# Patient Record
Sex: MALE | Race: WHITE | HISPANIC OR LATINO | Employment: FULL TIME | URBAN - METROPOLITAN AREA
[De-identification: names, ages, dates, MRNs, and addresses within clinical notes are randomized per-mention and may not be internally consistent; named-entity substitution may affect disease eponyms.]

---

## 2020-09-24 ENCOUNTER — HOSPITAL ENCOUNTER (EMERGENCY)
Facility: HOSPITAL | Age: 25
Discharge: HOME | End: 2020-09-25
Attending: EMERGENCY MEDICINE
Payer: OTHER GOVERNMENT

## 2020-09-24 DIAGNOSIS — E86.0 MILD DEHYDRATION: ICD-10-CM

## 2020-09-24 DIAGNOSIS — R11.0 NAUSEA: ICD-10-CM

## 2020-09-24 DIAGNOSIS — R74.01 TRANSAMINITIS: ICD-10-CM

## 2020-09-24 DIAGNOSIS — B34.9 ACUTE VIRAL SYNDROME: Primary | ICD-10-CM

## 2020-09-24 DIAGNOSIS — R07.9 NONSPECIFIC CHEST PAIN: ICD-10-CM

## 2020-09-24 LAB
ALBUMIN SERPL BCP-MCNC: 4.7 G/DL (ref 3.5–5.2)
ALP SERPL-CCNC: 88 U/L (ref 55–135)
ALT SERPL W/O P-5'-P-CCNC: 86 U/L (ref 10–44)
ANION GAP SERPL CALC-SCNC: 11 MMOL/L (ref 8–16)
AST SERPL-CCNC: 57 U/L (ref 10–40)
BASOPHILS # BLD AUTO: 0.02 K/UL (ref 0–0.2)
BASOPHILS NFR BLD: 0.3 % (ref 0–1.9)
BILIRUB SERPL-MCNC: 1.6 MG/DL (ref 0.1–1)
BUN SERPL-MCNC: 7 MG/DL (ref 6–20)
CALCIUM SERPL-MCNC: 9.1 MG/DL (ref 8.7–10.5)
CHLORIDE SERPL-SCNC: 102 MMOL/L (ref 95–110)
CK SERPL-CCNC: 112 U/L (ref 20–200)
CO2 SERPL-SCNC: 24 MMOL/L (ref 23–29)
CREAT SERPL-MCNC: 0.9 MG/DL (ref 0.5–1.4)
CRP SERPL-MCNC: 2.49 MG/DL (ref 0–0.75)
DIFFERENTIAL METHOD: ABNORMAL
EOSINOPHIL # BLD AUTO: 0.1 K/UL (ref 0–0.5)
EOSINOPHIL NFR BLD: 1.1 % (ref 0–8)
ERYTHROCYTE [DISTWIDTH] IN BLOOD BY AUTOMATED COUNT: 11.9 % (ref 11.5–14.5)
EST. GFR  (AFRICAN AMERICAN): >60 ML/MIN/1.73 M^2
EST. GFR  (NON AFRICAN AMERICAN): >60 ML/MIN/1.73 M^2
GLUCOSE SERPL-MCNC: 105 MG/DL (ref 70–110)
HCT VFR BLD AUTO: 45.5 % (ref 40–54)
HGB BLD-MCNC: 15.9 G/DL (ref 14–18)
IMM GRANULOCYTES # BLD AUTO: 0.02 K/UL (ref 0–0.04)
IMM GRANULOCYTES NFR BLD AUTO: 0.3 % (ref 0–0.5)
LIPASE SERPL-CCNC: 16 U/L (ref 4–60)
LYMPHOCYTES # BLD AUTO: 0.9 K/UL (ref 1–4.8)
LYMPHOCYTES NFR BLD: 12.2 % (ref 18–48)
MCH RBC QN AUTO: 30.8 PG (ref 27–31)
MCHC RBC AUTO-ENTMCNC: 34.9 G/DL (ref 32–36)
MCV RBC AUTO: 88 FL (ref 82–98)
MONOCYTES # BLD AUTO: 0.4 K/UL (ref 0.3–1)
MONOCYTES NFR BLD: 5.9 % (ref 4–15)
NEUTROPHILS # BLD AUTO: 5.8 K/UL (ref 1.8–7.7)
NEUTROPHILS NFR BLD: 80.2 % (ref 38–73)
NRBC BLD-RTO: 0 /100 WBC
PLATELET # BLD AUTO: 251 K/UL (ref 150–350)
PMV BLD AUTO: 10.1 FL (ref 9.2–12.9)
POTASSIUM SERPL-SCNC: 3.9 MMOL/L (ref 3.5–5.1)
PROT SERPL-MCNC: 8.4 G/DL (ref 6–8.4)
RBC # BLD AUTO: 5.16 M/UL (ref 4.6–6.2)
SARS-COV-2 RDRP RESP QL NAA+PROBE: NEGATIVE
SODIUM SERPL-SCNC: 137 MMOL/L (ref 136–145)
TROPONIN I SERPL DL<=0.01 NG/ML-MCNC: <0.01 NG/ML (ref 0.02–0.5)
WBC # BLD AUTO: 7.27 K/UL (ref 3.9–12.7)

## 2020-09-24 PROCEDURE — 71045 X-RAY EXAM CHEST 1 VIEW: CPT | Mod: TC,FY

## 2020-09-24 PROCEDURE — 93005 ELECTROCARDIOGRAM TRACING: CPT

## 2020-09-24 PROCEDURE — U0002 COVID-19 LAB TEST NON-CDC: HCPCS

## 2020-09-24 PROCEDURE — 99285 EMERGENCY DEPT VISIT HI MDM: CPT | Mod: 25

## 2020-09-24 PROCEDURE — 83690 ASSAY OF LIPASE: CPT

## 2020-09-24 PROCEDURE — 86140 C-REACTIVE PROTEIN: CPT

## 2020-09-24 PROCEDURE — 71045 X-RAY EXAM CHEST 1 VIEW: CPT | Mod: 26,,, | Performed by: RADIOLOGY

## 2020-09-24 PROCEDURE — 74176 CT ABD & PELVIS W/O CONTRAST: CPT | Mod: 26,,, | Performed by: RADIOLOGY

## 2020-09-24 PROCEDURE — 74176 CT ABDOMEN PELVIS WITHOUT CONTRAST: ICD-10-PCS | Mod: 26,,, | Performed by: RADIOLOGY

## 2020-09-24 PROCEDURE — 96360 HYDRATION IV INFUSION INIT: CPT

## 2020-09-24 PROCEDURE — 25000003 PHARM REV CODE 250: Performed by: EMERGENCY MEDICINE

## 2020-09-24 PROCEDURE — 84484 ASSAY OF TROPONIN QUANT: CPT

## 2020-09-24 PROCEDURE — 80053 COMPREHEN METABOLIC PANEL: CPT

## 2020-09-24 PROCEDURE — 81003 URINALYSIS AUTO W/O SCOPE: CPT

## 2020-09-24 PROCEDURE — 71045 XR CHEST 1 VIEW: ICD-10-PCS | Mod: 26,,, | Performed by: RADIOLOGY

## 2020-09-24 PROCEDURE — 96361 HYDRATE IV INFUSION ADD-ON: CPT

## 2020-09-24 PROCEDURE — 82550 ASSAY OF CK (CPK): CPT

## 2020-09-24 PROCEDURE — 74176 CT ABD & PELVIS W/O CONTRAST: CPT | Mod: TC

## 2020-09-24 PROCEDURE — 85025 COMPLETE CBC W/AUTO DIFF WBC: CPT

## 2020-09-24 RX ORDER — ACETAMINOPHEN 500 MG
1000 TABLET ORAL
Status: COMPLETED | OUTPATIENT
Start: 2020-09-24 | End: 2020-09-24

## 2020-09-24 RX ADMIN — SODIUM CHLORIDE 1000 ML: 0.9 INJECTION, SOLUTION INTRAVENOUS at 10:09

## 2020-09-24 RX ADMIN — ACETAMINOPHEN 1000 MG: 500 TABLET, FILM COATED ORAL at 11:09

## 2020-09-25 VITALS
RESPIRATION RATE: 16 BRPM | HEART RATE: 94 BPM | HEIGHT: 67 IN | DIASTOLIC BLOOD PRESSURE: 69 MMHG | BODY MASS INDEX: 29.03 KG/M2 | TEMPERATURE: 100 F | OXYGEN SATURATION: 96 % | SYSTOLIC BLOOD PRESSURE: 109 MMHG | WEIGHT: 185 LBS

## 2020-09-25 LAB
BILIRUB UR QL STRIP: ABNORMAL
CLARITY UR: CLEAR
COLOR UR: YELLOW
GLUCOSE UR QL STRIP: NEGATIVE
HGB UR QL STRIP: ABNORMAL
KETONES UR QL STRIP: NEGATIVE
LEUKOCYTE ESTERASE UR QL STRIP: NEGATIVE
NITRITE UR QL STRIP: NEGATIVE
PH UR STRIP: 6 [PH] (ref 5–8)
PROT UR QL STRIP: NEGATIVE
SP GR UR STRIP: 1.02 (ref 1–1.03)
URN SPEC COLLECT METH UR: ABNORMAL
UROBILINOGEN UR STRIP-ACNC: NEGATIVE EU/DL

## 2020-09-25 RX ORDER — HYOSCYAMINE SULFATE 0.125 MG
125 TABLET ORAL EVERY 6 HOURS PRN
Qty: 5 TABLET | Refills: 0 | Status: SHIPPED | OUTPATIENT
Start: 2020-09-25 | End: 2023-01-24

## 2020-09-25 RX ORDER — KETOROLAC TROMETHAMINE 10 MG/1
10 TABLET, FILM COATED ORAL EVERY 6 HOURS PRN
Qty: 15 TABLET | Refills: 0 | Status: SHIPPED | OUTPATIENT
Start: 2020-09-25 | End: 2020-09-30

## 2020-09-25 RX ORDER — ONDANSETRON 4 MG/1
4 TABLET, FILM COATED ORAL EVERY 8 HOURS PRN
Qty: 12 TABLET | Refills: 0 | Status: SHIPPED | OUTPATIENT
Start: 2020-09-25 | End: 2023-01-24

## 2020-09-25 NOTE — ED TRIAGE NOTES
chest pain and abdominal pain during inhalation/exhalation started this AM reports fever and taking ibprofen PTA

## 2020-09-25 NOTE — ED PROVIDER NOTES
Encounter Date: 9/24/2020       History     Chief Complaint   Patient presents with    Chest Pain    Abdominal Pain     25-year-old male no significant reported past medical/surgical history presenting with complaints gradual onset of diffuse upper abdominal pain as well as chest pain associated with shortness of breath and fever over the last 2 days.  Denies sore throat, nausea vomiting.  Patient does report decreased oral intake with headache.  Denies dysuria or frequency.  Reports radiation of pain to his back.  Denies fall/direct trauma or other injury.  Denies rash or lesions.  Denies known sick contacts.  Denies specific exacerbating or alleviating factors.  Denies prior abdominal surgery.        Review of patient's allergies indicates:   Allergen Reactions    Neosporin [benzalkonium chloride]      History reviewed. No pertinent past medical history.  History reviewed. No pertinent surgical history.  History reviewed. No pertinent family history.  Social History     Tobacco Use    Smoking status: Never Smoker    Smokeless tobacco: Never Used   Substance Use Topics    Alcohol use: Not Currently    Drug use: Never     Review of Systems   Constitutional: Positive for activity change. Negative for fever.   HENT: Negative for sore throat.    Eyes: Negative for visual disturbance.   Respiratory: Positive for shortness of breath.    Cardiovascular: Positive for chest pain.   Gastrointestinal: Positive for abdominal pain. Negative for diarrhea, nausea and vomiting.   Genitourinary: Positive for flank pain. Negative for difficulty urinating, discharge, dysuria, penile pain, scrotal swelling and testicular pain.   Musculoskeletal: Negative for arthralgias, back pain and myalgias.   Skin: Negative for rash.   Neurological: Positive for headaches. Negative for weakness.   Hematological: Does not bruise/bleed easily.       Physical Exam     Initial Vitals [09/24/20 2232]   BP Pulse Resp Temp SpO2   (!) 143/82 (!)  128 20 (!) 102.9 °F (39.4 °C) 98 %      MAP       --         Physical Exam    Nursing note and vitals reviewed.  Constitutional: He appears well-developed and well-nourished.   HENT:   Head: Normocephalic and atraumatic.   Mouth/Throat: Uvula is midline, oropharynx is clear and moist and mucous membranes are normal. No trismus in the jaw.   Eyes: Conjunctivae and EOM are normal. Pupils are equal, round, and reactive to light.   Neck: Normal range of motion. Neck supple.   Cardiovascular: Regular rhythm, normal heart sounds and intact distal pulses. Tachycardia present.    Pulmonary/Chest: Breath sounds normal. No respiratory distress. He has no wheezes.   Abdominal: Soft. Bowel sounds are normal. He exhibits no distension. There is generalized abdominal tenderness.   Musculoskeletal: Normal range of motion. No tenderness or edema.   Neurological: He is alert and oriented to person, place, and time. GCS eye subscore is 4. GCS verbal subscore is 5. GCS motor subscore is 6.   Skin: Skin is warm. Capillary refill takes less than 2 seconds.         ED Course   Procedures  Labs Reviewed   CBC W/ AUTO DIFFERENTIAL - Abnormal; Notable for the following components:       Result Value    Lymph # 0.9 (*)     Gran% 80.2 (*)     Lymph% 12.2 (*)     All other components within normal limits   COMPREHENSIVE METABOLIC PANEL - Abnormal; Notable for the following components:    Total Bilirubin 1.6 (*)     AST 57 (*)     ALT 86 (*)     All other components within normal limits   TROPONIN I - Abnormal; Notable for the following components:    Troponin I <0.01 (*)     All other components within normal limits   C-REACTIVE PROTEIN - Abnormal; Notable for the following components:    CRP 2.49 (*)     All other components within normal limits   URINALYSIS, REFLEX TO URINE CULTURE - Abnormal; Notable for the following components:    Bilirubin (UA) 1+ (*)     Occult Blood UA Trace (*)     All other components within normal limits     Narrative:     Specimen Source->Urine   CK   LIPASE   SARS-COV-2 RNA AMPLIFICATION, QUAL     EKG Readings: (Independently Interpreted)   Initial Reading: No STEMI. Previous EKG Date: No previous available for comparison. Rhythm: Sinus Tachycardia. Heart Rate: 120bpm. Ectopy: No Ectopy. Conduction: Normal. T Waves Flipped: III. Axis: Normal. Other Impression: Sinus tachycardia without ectopy.  No STEMI.  Nonspecific T-wave inversion in lead 3 with nonspecific ST changes.   milliseconds.  No previous available for comparison.       Imaging Results          CT Abdomen Pelvis  Without Contrast (In process)                X-Ray Chest 1 View (In process)               X-Rays:   Independently Interpreted Readings:   Other Readings:  Preliminary chest x-ray without acute cardiopulmonary processes.  No pneumonia pneumothorax.  No cardiomegaly or pleural effusion.    Medical Decision Making:   Initial Assessment:   25-year-old male nontoxic-appearing, febrile, diffuse myalgias, chest pain, abdominal pain tachycardic and febrile on arrival.  No meningismus.  No rash or lesions.  Abdomen soft no peritoneal findings no palpable mass mild diffuse upper abdominal tenderness without focality.  No McBurney's point tenderness.  Will send screening labs, COVID-19 testing, symptomatic treatment, IV hydration and reassessment  Differential Diagnosis:   Preliminary laboratory data without leukocytosis.  No significant anemia.  Metabolic panel without acidemia.  Mild transaminitis with elevated alkaline phosphatase of uncertain etiology.  Due to significant fever with no known source and diffuse abdominal pain will CT rule out acute cholecystitis, obstructed/infected uropathy.  Patient with significant allergy to all seafood will performed without contrast due to possible iodine allergy  Clinical Tests:   Lab Tests: Ordered and Reviewed  Radiological Study: Ordered and Reviewed  Medical Tests: Ordered and Reviewed  ED  Management:  Preliminary CT abdomen pelvis without acute intra-abdominal pathology per virtual Radiology.  No evidence of appendicitis.  No liver/biliary abnormality.  No bowel obstruction or perforation.  Upon reassessment patient resting no acute distress.  Tolerating p.o. without difficulty.  Normalization of vital signs after defervescence.  No increased work of breathing or any episodes of hypoxia/oxygen desaturation on continuous pulse oximetry.  Normalization of heart rate with continued sinus rhythm without any episodes of ectopy on continuous cardiac telemetry. Patient denies any complaints.  Reviewed and discussed laboratory and radiographic data.  Discussed symptomatic treatment for appears to be viral syndrome with close follow-up by his primary care physician or physician of his choice from the provider list.  Discussed symptomatic treatment, the importance of adequate hydration and strict return to ED precautions.  Patient verbalized understanding, his amenability to this projected plan of care and all his questions answered to his apparent satisfaction    Additional MDM:   Heart Score:    History:          Slightly suspicious.  ECG:             Nonspecific repolarisation disturbance  Age:               Less than 45 years  Risk factors: no risk factors known  Troponin:       Less than or equal to normal limit  Final Score: 1                              Clinical Impression:       ICD-10-CM ICD-9-CM   1. Acute viral syndrome  B34.9 079.99   2. Nonspecific chest pain  R07.9 786.50   3. Transaminitis  R74.0 790.4   4. Mild dehydration  E86.0 276.51   5. Nausea  R11.0 787.02                          ED Disposition Condition    Discharge Stable        ED Prescriptions     Medication Sig Dispense Start Date End Date Auth. Provider    ondansetron (ZOFRAN) 4 MG tablet Take 1 tablet (4 mg total) by mouth every 8 (eight) hours as needed for Nausea. 12 tablet 9/25/2020  Tay Mayorga,     hyoscyamine  (ANASPAZ,LEVSIN) 0.125 mg Tab Take 1 tablet (125 mcg total) by mouth every 6 (six) hours as needed. 5 tablet 9/25/2020 10/7/2020 Tay Mayorga,     ketorolac (TORADOL) 10 mg tablet Take 1 tablet (10 mg total) by mouth every 6 (six) hours as needed for Pain. 15 tablet 9/25/2020 9/30/2020 Tay Mayorga DO        Follow-up Information     Follow up With Specialties Details Why Contact Info    PRimary care physician or physician of your choice from the provider list  Schedule an appointment as soon as possible for a visit in 2 days for reevaluation     [facility] - ED Emergency Medicine  As needed, If symptoms worsen [address] [facility] [address] [phone]                                       Tay Mayorga DO  09/25/20 0250

## 2023-06-04 ENCOUNTER — HOSPITAL ENCOUNTER (EMERGENCY)
Facility: HOSPITAL | Age: 28
Discharge: HOME | End: 2023-06-04
Attending: EMERGENCY MEDICINE
Payer: COMMERCIAL

## 2023-06-04 VITALS
HEART RATE: 84 BPM | DIASTOLIC BLOOD PRESSURE: 95 MMHG | SYSTOLIC BLOOD PRESSURE: 120 MMHG | RESPIRATION RATE: 17 BRPM | WEIGHT: 180 LBS | BODY MASS INDEX: 28.25 KG/M2 | OXYGEN SATURATION: 97 % | HEIGHT: 67 IN | TEMPERATURE: 98 F

## 2023-06-04 DIAGNOSIS — K52.9 GASTROENTERITIS: Primary | ICD-10-CM

## 2023-06-04 PROCEDURE — 96361 HYDRATE IV INFUSION ADD-ON: CPT

## 2023-06-04 PROCEDURE — 99284 EMERGENCY DEPT VISIT MOD MDM: CPT | Mod: 25

## 2023-06-04 PROCEDURE — 96374 THER/PROPH/DIAG INJ IV PUSH: CPT

## 2023-06-04 PROCEDURE — 63600175 PHARM REV CODE 636 W HCPCS: Performed by: EMERGENCY MEDICINE

## 2023-06-04 RX ORDER — ONDANSETRON 4 MG/1
4 TABLET, FILM COATED ORAL EVERY 6 HOURS
Qty: 12 TABLET | Refills: 0 | Status: SHIPPED | OUTPATIENT
Start: 2023-06-04 | End: 2023-06-22

## 2023-06-04 RX ORDER — ONDANSETRON 2 MG/ML
4 INJECTION INTRAMUSCULAR; INTRAVENOUS
Status: COMPLETED | OUTPATIENT
Start: 2023-06-04 | End: 2023-06-04

## 2023-06-04 RX ADMIN — ONDANSETRON 4 MG: 2 INJECTION INTRAMUSCULAR; INTRAVENOUS at 02:06

## 2023-06-04 NOTE — ED PROVIDER NOTES
Encounter Date: 6/4/2023       History     Chief Complaint   Patient presents with    nausea;vomiting;diarrhea      Since midnight      28-year-old male with watery vomiting and diarrhea since 2:00 a.m. today.  Patient has not vomited in a couple of hours but had watery loose stools about 30 minutes ago.  No recent sick contacts.    The history is provided by the patient, medical records and a relative.   Review of patient's allergies indicates:   Allergen Reactions    Neosporin [benzalkonium chloride]      No past medical history on file.  No past surgical history on file.  Family History   Problem Relation Age of Onset    Diabetes Mother     Diabetes Father     Lupus Maternal Grandmother      Social History     Tobacco Use    Smoking status: Never    Smokeless tobacco: Never   Substance Use Topics    Alcohol use: Yes     Comment: occ    Drug use: Never     Review of Systems   Constitutional:  Positive for fatigue.   Gastrointestinal:  Positive for diarrhea and vomiting.   All other systems reviewed and are negative.    Physical Exam     Initial Vitals [06/04/23 1350]   BP Pulse Resp Temp SpO2   (!) 120/95 105 17 97.9 °F (36.6 °C) 97 %      MAP       --         Physical Exam    Nursing note and vitals reviewed.  Constitutional: He appears well-developed and well-nourished.   HENT:   Head: Atraumatic.   Dry mucous membranes.   Eyes: EOM are normal.   Neck: Neck supple.   Cardiovascular:            Tachycardic.   Pulmonary/Chest: Breath sounds normal. No respiratory distress.   Abdominal: Abdomen is soft.   Musculoskeletal:         General: No edema.      Cervical back: Neck supple.     Neurological: He is alert and oriented to person, place, and time.   Skin: Skin is warm and dry.   Psychiatric: He has a normal mood and affect.       ED Course   Procedures  Labs Reviewed - No data to display       Imaging Results    None          Medications   sodium chloride 0.9% bolus 2,000 mL 2,000 mL (0 mLs Intravenous Stopped  6/4/23 1508)   ondansetron injection 4 mg (4 mg Intravenous Given 6/4/23 1403)     Medical Decision Making:   Differential Diagnosis:   Gastroenteritis, dehydration  ED Management:  Healthy 28-year-old male with 12 hours of vomiting and diarrhea.  Patient appears dehydrated.  We will give something for nausea and fluid boluses.  Expand workup as needed.           ED Course as of 06/04/23 1508   Sun Jun 04, 2023   1507 Stable at discharge.  Patient's pulse was averaging about 105, now mid 70s. [RJ]      ED Course User Index  [RJ] Vance Matthews MD                 Clinical Impression:   Final diagnoses:  [K52.9] Gastroenteritis (Primary)        ED Disposition Condition    Discharge Stable          ED Prescriptions       Medication Sig Dispense Start Date End Date Auth. Provider    ondansetron (ZOFRAN) 4 MG tablet Take 1 tablet (4 mg total) by mouth every 6 (six) hours. 12 tablet 6/4/2023 -- Vance Matthews MD          Follow-up Information       Follow up With Specialties Details Why Contact Info    [place] - Emergency Dept Emergency Medicine  As needed [address] [facility] [address] [phone]             Vance Matthews MD  06/04/23 1504

## 2023-06-16 ENCOUNTER — HOSPITAL ENCOUNTER (OUTPATIENT)
Dept: RADIOLOGY | Facility: HOSPITAL | Age: 28
Discharge: HOME | End: 2023-06-16
Attending: NURSE PRACTITIONER
Payer: COMMERCIAL

## 2023-06-16 ENCOUNTER — HOSPITAL ENCOUNTER (OUTPATIENT)
Dept: CARDIOLOGY | Facility: HOSPITAL | Age: 28
Discharge: HOME | End: 2023-06-16
Attending: NURSE PRACTITIONER
Payer: COMMERCIAL

## 2023-06-16 VITALS — BODY MASS INDEX: 29.66 KG/M2 | HEIGHT: 67 IN | WEIGHT: 189 LBS

## 2023-06-16 DIAGNOSIS — R79.89 ELEVATED D-DIMER: ICD-10-CM

## 2023-06-16 DIAGNOSIS — M79.601 BILATERAL ARM PAIN: ICD-10-CM

## 2023-06-16 DIAGNOSIS — R79.89 LOW TSH LEVEL: ICD-10-CM

## 2023-06-16 DIAGNOSIS — R61 DIAPHORESIS: ICD-10-CM

## 2023-06-16 DIAGNOSIS — R74.01 ELEVATED AST (SGOT): ICD-10-CM

## 2023-06-16 DIAGNOSIS — R07.9 CHEST PAIN, UNSPECIFIED TYPE: ICD-10-CM

## 2023-06-16 DIAGNOSIS — Z82.69 FAMILY HISTORY OF SYSTEMIC LUPUS ERYTHEMATOSUS: ICD-10-CM

## 2023-06-16 DIAGNOSIS — K76.0 HEPATIC STEATOSIS: ICD-10-CM

## 2023-06-16 DIAGNOSIS — M79.602 BILATERAL ARM PAIN: ICD-10-CM

## 2023-06-16 DIAGNOSIS — R52 GENERALIZED BODY ACHES: ICD-10-CM

## 2023-06-16 DIAGNOSIS — R07.89 CHEST TIGHTNESS: ICD-10-CM

## 2023-06-16 DIAGNOSIS — R03.0 ELEVATED BLOOD PRESSURE READING WITHOUT DIAGNOSIS OF HYPERTENSION: ICD-10-CM

## 2023-06-16 DIAGNOSIS — Z11.52 ENCOUNTER FOR SCREENING FOR COVID-19: ICD-10-CM

## 2023-06-16 DIAGNOSIS — R68.83 CHILLS (WITHOUT FEVER): ICD-10-CM

## 2023-06-16 DIAGNOSIS — R10.9 ABDOMINAL PAIN, UNSPECIFIED ABDOMINAL LOCATION: ICD-10-CM

## 2023-06-16 LAB
AORTIC ROOT ANNULUS: 2.82 CM
AORTIC VALVE CUSP SEPERATION: 1.53 CM
ASCENDING AORTA: 2.66 CM
AV INDEX (PROSTH): 0.69
AV MEAN GRADIENT: 5 MMHG
AV PEAK GRADIENT: 9 MMHG
AV VALVE AREA: 2.39 CM2
AV VELOCITY RATIO: 0.73
BSA FOR ECHO PROCEDURE: 2.01 M2
CV ECHO LV RWT: 0.45 CM
DOP CALC AO PEAK VEL: 1.47 M/S
DOP CALC AO VTI: 28 CM
DOP CALC LVOT AREA: 3.5 CM2
DOP CALC LVOT DIAMETER: 2.1 CM
DOP CALC LVOT PEAK VEL: 1.08 M/S
DOP CALC LVOT STROKE VOLUME: 66.81 CM3
DOP CALC MV VTI: 30.1 CM
DOP CALCLVOT PEAK VEL VTI: 19.3 CM
E WAVE DECELERATION TIME: 234.52 MSEC
E/A RATIO: 1.56
E/E' RATIO: 5.76 M/S
ECHO LV POSTERIOR WALL: 1 CM (ref 0.6–1.1)
EJECTION FRACTION: 67 %
FRACTIONAL SHORTENING: 37 % (ref 28–44)
INTERVENTRICULAR SEPTUM: 1.02 CM (ref 0.6–1.1)
IVC DIAMETER: 1.75 CM
IVRT: 34.25 MSEC
LA MAJOR: 3.62 CM
LA MINOR: 2.2 CM
LEFT ATRIUM SIZE: 3.71 CM
LEFT ATRIUM VOLUME INDEX MOD: 12.4 ML/M2
LEFT ATRIUM VOLUME MOD: 24.49 CM3
LEFT INTERNAL DIMENSION IN SYSTOLE: 2.82 CM (ref 2.1–4)
LEFT VENTRICLE DIASTOLIC VOLUME INDEX: 46.4 ML/M2
LEFT VENTRICLE DIASTOLIC VOLUME: 91.4 ML
LEFT VENTRICLE MASS INDEX: 78 G/M2
LEFT VENTRICLE SYSTOLIC VOLUME INDEX: 15.3 ML/M2
LEFT VENTRICLE SYSTOLIC VOLUME: 30.05 ML
LEFT VENTRICULAR INTERNAL DIMENSION IN DIASTOLE: 4.48 CM (ref 3.5–6)
LEFT VENTRICULAR MASS: 154.28 G
LV LATERAL E/E' RATIO: 4.75 M/S
LV SEPTAL E/E' RATIO: 7.31 M/S
LVOT MG: 2.57 MMHG
LVOT MV: 0.75 CM/S
MV MEAN GRADIENT: 2 MMHG
MV PEAK A VEL: 0.61 M/S
MV PEAK E VEL: 0.95 M/S
MV PEAK GRADIENT: 4 MMHG
MV STENOSIS PRESSURE HALF TIME: 62.56 MS
MV VALVE AREA BY CONTINUITY EQUATION: 2.22 CM2
MV VALVE AREA P 1/2 METHOD: 3.52 CM2
PISA MRMAX VEL: 1.55 M/S
PISA TR MAX VEL: 1.86 M/S
PULM VEIN S/D RATIO: 0.7
PV MEAN GRADIENT: 3 MMHG
PV MV: 0.8 M/S
PV PEAK D VEL: 0.64 M/S
PV PEAK S VEL: 0.45 M/S
PV PEAK VELOCITY: 1.09 CM/S
RA MAJOR: 3.33 CM
RA PRESSURE: 3 MMHG
RA WIDTH: 2.36 CM
RIGHT VENTRICULAR END-DIASTOLIC DIMENSION: 2.71 CM
RIGHT VENTRICULAR LENGTH IN DIASTOLE (APICAL 4-CHAMBER VIEW): 54.1 CM
RV MID DIAMA: 2.16 CM
STJ: 2.56 CM
TDI LATERAL: 0.2 M/S
TDI SEPTAL: 0.13 M/S
TDI: 0.17 M/S
TR MAX PG: 14 MMHG
TRICUSPID VALVE PEAK A WAVE VELOCITY: 0.54 M/S
TV PEAK E VEL: 0.5 M/S
TV REST PULMONARY ARTERY PRESSURE: 17 MMHG

## 2023-06-16 PROCEDURE — 25500020 PHARM REV CODE 255: Performed by: INTERNAL MEDICINE

## 2023-06-16 PROCEDURE — 76700 US ABDOMEN COMPLETE: ICD-10-PCS | Mod: 26,,, | Performed by: RADIOLOGY

## 2023-06-16 PROCEDURE — 76700 US EXAM ABDOM COMPLETE: CPT | Mod: 26,,, | Performed by: RADIOLOGY

## 2023-06-16 PROCEDURE — 76700 US EXAM ABDOM COMPLETE: CPT | Mod: TC

## 2023-06-16 PROCEDURE — 25500020 PHARM REV CODE 255: Performed by: NURSE PRACTITIONER

## 2023-06-16 PROCEDURE — 93306 TTE W/DOPPLER COMPLETE: CPT | Mod: 26,,, | Performed by: INTERNAL MEDICINE

## 2023-06-16 PROCEDURE — 93306 ECHO (CUPID ONLY): ICD-10-PCS | Mod: 26,,, | Performed by: INTERNAL MEDICINE

## 2023-06-16 PROCEDURE — C8929 TTE W OR WO FOL WCON,DOPPLER: HCPCS

## 2023-06-16 RX ADMIN — IOHEXOL 100 ML: 350 INJECTION, SOLUTION INTRAVENOUS at 02:06

## 2023-06-16 RX ADMIN — SULFUR HEXAFLUORIDE 5 ML: KIT at 12:06

## 2023-06-27 ENCOUNTER — HOSPITAL ENCOUNTER (OUTPATIENT)
Dept: CARDIOLOGY | Facility: HOSPITAL | Age: 28
Discharge: HOME | End: 2023-06-27
Attending: NURSE PRACTITIONER
Payer: COMMERCIAL

## 2023-06-27 DIAGNOSIS — R00.2 PALPITATIONS: ICD-10-CM

## 2023-06-27 DIAGNOSIS — R07.9 CHEST PAIN, UNSPECIFIED TYPE: ICD-10-CM

## 2023-06-27 DIAGNOSIS — R07.89 CHEST TIGHTNESS: ICD-10-CM

## 2023-06-27 PROCEDURE — 93226 XTRNL ECG REC<48 HR SCAN A/R: CPT

## 2023-06-27 PROCEDURE — 93227 XTRNL ECG REC<48 HR R&I: CPT | Mod: ,,, | Performed by: INTERNAL MEDICINE

## 2023-06-27 PROCEDURE — 93227 HOLTER MONITOR - 48 HOUR (CUPID ONLY): ICD-10-PCS | Mod: ,,, | Performed by: INTERNAL MEDICINE

## 2023-06-30 LAB
OHS CV EVENT MONITOR DAY: 0
OHS CV HOLTER LENGTH DECIMAL HOURS: 47.98
OHS CV HOLTER LENGTH HOURS: 47
OHS CV HOLTER LENGTH MINUTES: 59
OHS CV HOLTER SINUS AVERAGE HR: 77
OHS CV HOLTER SINUS MAX HR: 130
OHS CV HOLTER SINUS MIN HR: 47